# Patient Record
(demographics unavailable — no encounter records)

---

## 2025-07-09 NOTE — PHYSICAL EXAM
[No Acute Distress] : no acute distress [Well Nourished] : well nourished [No Respiratory Distress] : no respiratory distress  [No Accessory Muscle Use] : no accessory muscle use [Clear to Auscultation] : lungs were clear to auscultation bilaterally [Normal Rate] : normal rate  [Regular Rhythm] : with a regular rhythm [Normal S1, S2] : normal S1 and S2 [No Murmur] : no murmur heard [Soft] : abdomen soft [Non Tender] : non-tender [Non-distended] : non-distended [No HSM] : no HSM [Normal Affect] : the affect was normal [Normal Insight/Judgement] : insight and judgment were intact

## 2025-07-09 NOTE — ASSESSMENT
[FreeTextEntry1] : 58 yo F, unclear hx of breast nodule s/p biopsy benign in 2021, presents to establish care.  #Hx of breast lesion? - Pt reports hx of breast lesion s/p biopsy in 2021 that was benign - Yearly mammo since negative - Last mammo in 2024 negative - Referral for mammo given  #Constipation - Patinet reports mild constipation, 1BM every 2 days - No red flags, no weight loss, no blood in stool, good appetite - Last colonocopy 2023 negative, no polyps - Can start miralax qd  #Skin discoloration - Patent reports tightness and darkness of fingers - No associated symptoms - Sent ammonium lactate - Derm referral  #Elevated BP reading - /98, repeat 143/96 - No hx of HTN  - Repeat BP next visit - Advised to check BP at home  #HCM - Routine blood work and rtc in one month - Mammo referral given  - Gyn referral given, PAP negative in 2024 per pt, but would like to see GYN for check up - Last colono in 2023, normal Colonoscopy in 2030

## 2025-07-09 NOTE — HISTORY OF PRESENT ILLNESS
[FreeTextEntry1] : Establish care [de-identified] : 58 yo F, unclear hx of brest nodule s/p biopsy benign in 2021, presents to establish care. No complaints today except mild constipation.  Not on meds.  Last saw pcp in [place] last year.

## 2025-07-09 NOTE — ASSESSMENT
[FreeTextEntry1] : 56 yo F, unclear hx of breast nodule s/p biopsy benign in 2021, presents to establish care.  #Hx of breast lesion? - Pt reports hx of breast lesion s/p biopsy in 2021 that was benign - Yearly mammo since negative - Last mammo in 2024 negative - Referral for mammo given  #Constipation - Patinet reports mild constipation, 1BM every 2 days - No red flags, no weight loss, no blood in stool, good appetite - Last colonocopy 2023 negative, no polyps - Can start miralax qd  #Skin discoloration - Patent reports tightness and darkness of fingers - No associated symptoms - Sent ammonium lactate - Derm referral  #Elevated BP reading - /98, repeat 143/96 - No hx of HTN  - Repeat BP next visit - Advised to check BP at home  #HCM - Routine blood work and rtc in one month - Mammo referral given  - Gyn referral given, PAP negative in 2024 per pt, but would like to see GYN for check up - Last colono in 2023, normal Colonoscopy in 2030

## 2025-07-09 NOTE — HISTORY OF PRESENT ILLNESS
[FreeTextEntry1] : Establish care [de-identified] : 58 yo F, unclear hx of brest nodule s/p biopsy benign in 2021, presents to establish care. No complaints today except mild constipation.  Not on meds.  Last saw pcp in [place] last year.